# Patient Record
Sex: MALE | Race: BLACK OR AFRICAN AMERICAN | Employment: PART TIME | URBAN - METROPOLITAN AREA
[De-identification: names, ages, dates, MRNs, and addresses within clinical notes are randomized per-mention and may not be internally consistent; named-entity substitution may affect disease eponyms.]

---

## 2017-11-16 ENCOUNTER — PRE VISIT (OUTPATIENT)
Dept: SURGERY | Facility: CLINIC | Age: 52
End: 2017-11-16

## 2017-11-16 NOTE — TELEPHONE ENCOUNTER
Phone Call:    Who did you talk to? (or) Who did you call? [facility]   Call Detail/Action: LVM asking for records to be urgently faxed.

## 2019-02-13 ENCOUNTER — HOSPITAL ENCOUNTER (EMERGENCY)
Facility: CLINIC | Age: 54
Discharge: HOME | End: 2019-02-13
Attending: EMERGENCY MEDICINE | Admitting: EMERGENCY MEDICINE

## 2019-02-13 ENCOUNTER — APPOINTMENT (OUTPATIENT)
Dept: GENERAL RADIOLOGY | Facility: CLINIC | Age: 54
End: 2019-02-13
Attending: EMERGENCY MEDICINE

## 2019-02-13 VITALS
HEIGHT: 67 IN | OXYGEN SATURATION: 98 % | SYSTOLIC BLOOD PRESSURE: 105 MMHG | TEMPERATURE: 98 F | BODY MASS INDEX: 23.54 KG/M2 | DIASTOLIC BLOOD PRESSURE: 68 MMHG | WEIGHT: 150 LBS | RESPIRATION RATE: 16 BRPM

## 2019-02-13 DIAGNOSIS — S30.0XXA CONTUSION OF LOWER BACK, INITIAL ENCOUNTER: ICD-10-CM
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
## 2019-02-13 PROCEDURE — 99284 EMERGENCY DEPT VISIT MOD MDM: CPT

## 2019-02-13 PROCEDURE — 72100 X-RAY EXAM L-S SPINE 2/3 VWS: CPT

## 2019-02-13 PROCEDURE — 25000132 ZZH RX MED GY IP 250 OP 250 PS 637: Performed by: EMERGENCY MEDICINE

## 2019-02-13 RX ORDER — IBUPROFEN 600 MG/1
600 TABLET, FILM COATED ORAL ONCE
Status: COMPLETED | OUTPATIENT
Start: 2019-02-13 | End: 2019-02-13

## 2019-02-13 RX ADMIN — IBUPROFEN 600 MG: 600 TABLET ORAL at 13:46

## 2019-02-13 SDOH — HEALTH STABILITY: MENTAL HEALTH: HOW OFTEN DO YOU HAVE A DRINK CONTAINING ALCOHOL?: NEVER

## 2019-02-13 ASSESSMENT — ENCOUNTER SYMPTOMS
MYALGIAS: 1
VOMITING: 0
ARTHRALGIAS: 1

## 2019-02-13 ASSESSMENT — MIFFLIN-ST. JEOR: SCORE: 1484.03
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
## 2019-02-13 NOTE — LETTER
February 13, 2019      To Whom It May Concern:      Lorne Feliciano was seen in our Emergency Department today, 02/13/19.  I expect his condition to improve over the next   day  He may return to work/school tomorrow  when improved.    Sincerely,        Dr Barger

## 2019-02-13 NOTE — ED AVS SNAPSHOT
Emergency Department  [address] [place] [id number]  Phone:  [phone]  Fax:  [phone]                                    Lorne Feliciano   MRN: [id number]    Department:   Emergency Department   Date of Visit:  2/13/2019           After Visit Summary Signature Page    I have received my discharge instructions, and my questions have been answered. I have discussed any challenges I see with this plan with the nurse or doctor.    ..........................................................................................................................................  Patient/Patient Representative Signature      ..........................................................................................................................................  Patient Representative Print Name and Relationship to Patient    ..................................................               ................................................  Date                                   Time    ..........................................................................................................................................  Reviewed by Signature/Title    ...................................................              ..............................................  Date                                               Time          22EPIC Rev 08/18

## 2024-04-16 NOTE — TELEPHONE ENCOUNTER
APPT INFO    Date /Time: 11/16/17   Reason for Appt: Hemorrhoids   Ref Provider/Clinic: JOSIANE Bowers, [facility]   Are there internal records? If yes, list: No   Patient Contact (Y/N) & Call Details: No referred  Has not seen a specialist before   Action: Requested records     OUTSIDE RECORDS CHECKLIST     CLINIC NAME COMMENTS REC (x) IMG (x)   [facility] Ph:   [phone]; Fax:  [phone]                 Observation and assessment